# Patient Record
Sex: MALE | Race: BLACK OR AFRICAN AMERICAN | Employment: FULL TIME | URBAN - METROPOLITAN AREA
[De-identification: names, ages, dates, MRNs, and addresses within clinical notes are randomized per-mention and may not be internally consistent; named-entity substitution may affect disease eponyms.]

---

## 2024-05-27 ENCOUNTER — HOSPITAL ENCOUNTER (EMERGENCY)
Facility: HOSPITAL | Age: 44
Discharge: HOME | End: 2024-05-27
Attending: EMERGENCY MEDICINE

## 2024-05-27 VITALS
OXYGEN SATURATION: 99 % | DIASTOLIC BLOOD PRESSURE: 81 MMHG | TEMPERATURE: 98 F | HEART RATE: 84 BPM | BODY MASS INDEX: 23.94 KG/M2 | HEIGHT: 71 IN | WEIGHT: 171 LBS | SYSTOLIC BLOOD PRESSURE: 156 MMHG | RESPIRATION RATE: 18 BRPM

## 2024-05-27 DIAGNOSIS — S61.213A LACERATION OF LEFT MIDDLE FINGER WITHOUT FOREIGN BODY WITHOUT DAMAGE TO NAIL, INITIAL ENCOUNTER: Primary | ICD-10-CM

## 2024-05-27 PROCEDURE — 12001 RPR S/N/AX/GEN/TRNK 2.5CM/<: CPT

## 2024-05-27 PROCEDURE — 90471 IMMUNIZATION ADMIN: CPT

## 2024-05-27 PROCEDURE — 99283 EMERGENCY DEPT VISIT LOW MDM: CPT

## 2024-05-27 NOTE — ED PROVIDER NOTES
Patient Seen in: [facility] Emergency Department      History     Chief Complaint   Patient presents with    Laceration     Stated Complaint: laceration, cut finger at work    Subjective:   HPI    43-year-old male presents emergency room with chief complaint of laceration to the left third digit.  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  Last tetanus shot greater than 10 years.  Patient is right-hand dominant.  Denies any other injuries.  Denies difficulty moving in the digits.  Denies numbness tingling weakness.    Objective:   History reviewed. No pertinent past medical history.           No pertinent past surgical history.              No pertinent social history.            Review of Systems    Positive for stated complaint: laceration, cut finger at work  Other systems are as noted in HPI.  Constitutional and vital signs reviewed.      All other systems reviewed and negative except as noted above.    Physical Exam     ED Triage Vitals [05/27/24 1434]   /81   Pulse 84   Resp 18   Temp 98 °F (36.7 °C)   Temp src Temporal   SpO2 99 %   O2 Device None (Room air)       Current Vitals:   Vital Signs  BP: 156/81  Pulse: 84  Resp: 18  Temp: 98 °F (36.7 °C)  Temp src: Temporal    Oxygen Therapy  SpO2: 99 %  O2 Device: None (Room air)            Physical Exam    General: Awake alert no acute distress  Left hand: There is a 1 cm laceration to the pad/tip of the left third digit, no nerve tendon or artery injury.  Full range of motion of all joints of the digits.  No other injuries to the hand noted.    ED Course   Labs Reviewed - No data to display          Laceration was anesthetized with lidocaine locally.  The wound was cleansed and irrigated copiously.  There was no visible foreign body, vessel, nerve, bone , joint space, or tendon within the wound. The wound was closed using a simple closure with 5-0 nylon.  The quality of the closure was excellent           MDM         Differential diagnosis before testing includes but not limited to laceration, foreign body, tendon laceration, arterial injury, nerve injury, which is a medical condition that poses a threat to life/function    Medications Provided: Tdap    Course of Events during Emergency Room Visit include wound thoroughly cleansed, suture repair was performed, I discussed wound care including wound check in 48 hours.  Suture removal 7-10 days.  May alternate ibuprofen and Tylenol as needed for pain.  Follow-up with primary care return to ER if any change or symptoms.  Discharge good condition.    Shared decision making was utilized             Discharge  I have discussed with the patient the results of test, differential diagnosis, treatment plan, warning signs and symptoms which should prompt immediate return.  They expressed understanding of these instructions and agrees to the following plan provided.  They were given written discharge instructions and agrees to return for any concerns and voiced understanding and all questions were answered.    Note to patient: The 21st Century Cures Act makes medical notes like these available to patients in the interest of transparency. However, this is a medical document intended as peer to peer communication. It is written in medical language and may contain abbreviations or verbiage that are unfamiliar. It may appear blunt or direct. Medical documents are intended to carry relevant information, facts as evident, and the clinical opinion of the practitioner.                                            Medical Decision Making      Disposition and Plan     Clinical Impression:  1. Laceration of left middle finger without foreign body without damage to nail, initial encounter         Disposition:  Discharge  5/27/2024  4:14 pm    Follow-up:  Charlotte Vieira DO  [address] [place] [id number]  [phone]    Follow up in 2 day(s)            Medications Prescribed:  There  are no discharge medications for this patient.

## (undated) NOTE — LETTER
Date & Time: 5/27/2024, 4:33 PM  Patient: Michel Molina  Encounter Provider(s):    Supa Dumont DO       To Whom It May Concern:    Michel Molina was seen and treated in our department on 5/27/2024. He should not return to work until sutures removed in 7 to 10 days .    If you have any questions or concerns, please do not hesitate to call.        _____________________________  Physician/APC Signature